# Patient Record
Sex: MALE | Race: BLACK OR AFRICAN AMERICAN | NOT HISPANIC OR LATINO | Employment: FULL TIME | URBAN - METROPOLITAN AREA
[De-identification: names, ages, dates, MRNs, and addresses within clinical notes are randomized per-mention and may not be internally consistent; named-entity substitution may affect disease eponyms.]

---

## 2017-01-23 ENCOUNTER — OFFICE VISIT (OUTPATIENT)
Dept: ORTHOPEDICS | Facility: CLINIC | Age: 32
End: 2017-01-23
Payer: OTHER MISCELLANEOUS

## 2017-01-23 VITALS — HEART RATE: 85 BPM | DIASTOLIC BLOOD PRESSURE: 82 MMHG | SYSTOLIC BLOOD PRESSURE: 134 MMHG | HEIGHT: 70 IN

## 2017-01-23 DIAGNOSIS — S86.012D ACHILLES TENDON TEAR, LEFT, SUBSEQUENT ENCOUNTER: Primary | ICD-10-CM

## 2017-01-23 PROCEDURE — 99999 PR PBB SHADOW E&M-EST. PATIENT-LVL II: CPT | Mod: PBBFAC,,, | Performed by: ORTHOPAEDIC SURGERY

## 2017-01-23 PROCEDURE — 99212 OFFICE O/P EST SF 10 MIN: CPT | Mod: S$GLB,,, | Performed by: ORTHOPAEDIC SURGERY

## 2017-01-24 NOTE — PROGRESS NOTES
Mr. Arias returns today.  It has been about two and a half months since his   left Achilles tendon tear.  He has been treated nonoperatively with casting and   since his last visit boot.  He returns today for followup.  He reports minimal   discomfort.  Examination reveals some continued swelling.  The calf compression   produces plantar flexion of the foot.  There is minimal tenderness over the   Achilles tendon.  At this point, I would like him to start some physical   therapy.  He can come out of the boot when he is at home.  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  I am going to have him   return to see me in six weeks for followup.      CHARLIE  dd: 01/23/2017 10:56:55 (CST)  td: 01/24/2017 00:13:40 (CST)  Doc ID   #[id number]  Job ID #[id number]    CC:

## 2017-02-03 ENCOUNTER — TELEPHONE (OUTPATIENT)
Dept: ORTHOPEDICS | Facility: CLINIC | Age: 32
End: 2017-02-03

## 2017-02-03 NOTE — TELEPHONE ENCOUNTER
----- Message from María Wick sent at 2/3/2017  3:41 PM CST -----  Contact: self@ HOME   Pt need to get a order for PT.

## 2017-02-06 ENCOUNTER — TELEPHONE (OUTPATIENT)
Dept: ORTHOPEDICS | Facility: CLINIC | Age: 32
End: 2017-02-06

## 2017-02-06 NOTE — TELEPHONE ENCOUNTER
Returned call and left message asking patient to call back with name of PT location and fax number if he has it and I will send the order to them.

## 2017-02-06 NOTE — TELEPHONE ENCOUNTER
----- Message from Alberto Card sent at 2/6/2017  9:55 AM CST -----  Contact: patient  Pt called to give the information for the physical therapy. [facility]. [phone]   (Fax)[phone]

## 2017-03-06 ENCOUNTER — OFFICE VISIT (OUTPATIENT)
Dept: ORTHOPEDICS | Facility: CLINIC | Age: 32
End: 2017-03-06
Payer: OTHER MISCELLANEOUS

## 2017-03-06 VITALS
WEIGHT: 298.5 LBS | SYSTOLIC BLOOD PRESSURE: 138 MMHG | DIASTOLIC BLOOD PRESSURE: 92 MMHG | BODY MASS INDEX: 42.73 KG/M2 | HEIGHT: 70 IN | HEART RATE: 106 BPM | RESPIRATION RATE: 18 BRPM

## 2017-03-06 DIAGNOSIS — S86.012D ACHILLES TENDON TEAR, LEFT, SUBSEQUENT ENCOUNTER: Primary | ICD-10-CM

## 2017-03-06 PROCEDURE — 99212 OFFICE O/P EST SF 10 MIN: CPT | Mod: S$GLB,,, | Performed by: ORTHOPAEDIC SURGERY

## 2017-03-06 PROCEDURE — 99999 PR PBB SHADOW E&M-EST. PATIENT-LVL III: CPT | Mod: PBBFAC,,, | Performed by: ORTHOPAEDIC SURGERY

## 2017-03-07 NOTE — PROGRESS NOTES
Mr. Arias returns today.  It has been four months since he sustained a left   Achilles tendon tear.  He has been treated nonoperatively.  He is making good   progress.  He reports no pain.  He has been fully weightbearing in the boot.  He   started physical therapy.  Examination today reveals that the Achilles tendon   is intact.  Calf compression produces good plantar flexion of the foot.  He has   no tenderness.  He still has some mild calf atrophy.  At this point, he can come   out of the boot.  He can progress his low-impact activity as tolerated.  He   should avoid any high-impact activity for the next two months.  I am going to   have him return to see me as needed.      CHARLIE  dd: 03/06/2017 09:51:42 (CST)  td: 03/07/2017 09:12:25 (CST)  Doc ID   #[id number]  Job ID #[id number]    CC:

## 2017-03-31 ENCOUNTER — TELEPHONE (OUTPATIENT)
Dept: ORTHOPEDICS | Facility: CLINIC | Age: 32
End: 2017-03-31

## 2017-03-31 NOTE — TELEPHONE ENCOUNTER
----- Message from Mendy Hall sent at 3/31/2017 11:56 AM CDT -----  Contact: Pt  Pt would like to speak with someone regarding his rehab    Pt contact number  [phone]  Thanks

## 2017-04-07 ENCOUNTER — TELEPHONE (OUTPATIENT)
Dept: ORTHOPEDICS | Facility: CLINIC | Age: 32
End: 2017-04-07

## 2017-04-07 NOTE — TELEPHONE ENCOUNTER
----- Message from María Wick sent at 4/7/2017  2:28 PM CDT -----  Contact: self@ home   Pt is calling to get a order for PT  And pt is having left ankle pain.

## 2017-04-07 NOTE — TELEPHONE ENCOUNTER
Patient called back. [facility]. 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 He says he is icing and elevating without much relief. I suggested ibuprofen but he doesn't like to take medicine. I will talk to  on Monday and call him back.

## 2020-04-27 DIAGNOSIS — M25.511 CHRONIC RIGHT SHOULDER PAIN: Primary | ICD-10-CM

## 2020-04-27 DIAGNOSIS — G89.29 CHRONIC RIGHT SHOULDER PAIN: Primary | ICD-10-CM

## 2020-04-29 ENCOUNTER — HOSPITAL ENCOUNTER (OUTPATIENT)
Dept: RADIOLOGY | Facility: HOSPITAL | Age: 35
Discharge: HOME | End: 2020-04-29
Attending: PHYSICIAN ASSISTANT
Payer: COMMERCIAL

## 2020-04-29 DIAGNOSIS — M25.511 CHRONIC RIGHT SHOULDER PAIN: ICD-10-CM

## 2020-04-29 DIAGNOSIS — G89.29 CHRONIC RIGHT SHOULDER PAIN: ICD-10-CM

## 2020-04-29 PROCEDURE — 73030 X-RAY EXAM OF SHOULDER: CPT | Mod: TC,FY,RT

## 2020-04-29 PROCEDURE — 73030 X-RAY EXAM OF SHOULDER: CPT | Mod: 26,RT,, | Performed by: RADIOLOGY

## 2020-04-29 PROCEDURE — 73030 XR SHOULDER COMPLETE 2 OR MORE VIEWS RIGHT: ICD-10-PCS | Mod: 26,RT,, | Performed by: RADIOLOGY

## 2021-07-01 ENCOUNTER — OCCUPATIONAL HEALTH (OUTPATIENT)
Dept: URGENT CARE | Facility: CLINIC | Age: 36
End: 2021-07-01

## 2021-07-01 DIAGNOSIS — Z02.83 ENCOUNTER FOR DRUG SCREENING: Primary | ICD-10-CM

## 2021-07-01 DIAGNOSIS — Z02.1 ENCOUNTER FOR PRE-EMPLOYMENT HEALTH SCREENING EXAMINATION: Primary | ICD-10-CM

## 2021-07-01 PROCEDURE — 99199 OCC MED MRO FEE: ICD-10-PCS | Mod: S$GLB,,, | Performed by: EMERGENCY MEDICINE

## 2021-07-01 PROCEDURE — 99199 UNLISTED SPECIAL SVC PX/RPRT: CPT | Mod: S$GLB,,, | Performed by: EMERGENCY MEDICINE

## 2021-07-01 PROCEDURE — 99499 UNLISTED E&M SERVICE: CPT | Mod: S$GLB,,, | Performed by: EMERGENCY MEDICINE

## 2021-07-01 PROCEDURE — 80305 DRUG TEST PRSMV DIR OPT OBS: CPT | Mod: S$GLB,,, | Performed by: EMERGENCY MEDICINE

## 2021-07-01 PROCEDURE — 99499 PHYSICAL, BASIC COMPLEXITY: ICD-10-PCS | Mod: S$GLB,,, | Performed by: EMERGENCY MEDICINE

## 2021-07-01 PROCEDURE — 80305 OOH COLLECTION ONLY DRUG SCREEN: ICD-10-PCS | Mod: S$GLB,,, | Performed by: EMERGENCY MEDICINE

## 2021-09-01 ENCOUNTER — HOSPITAL ENCOUNTER (EMERGENCY)
Facility: HOSPITAL | Age: 36
Discharge: HOME | End: 2021-09-01
Attending: EMERGENCY MEDICINE
Payer: COMMERCIAL

## 2021-09-01 VITALS
WEIGHT: 302 LBS | BODY MASS INDEX: 44.73 KG/M2 | OXYGEN SATURATION: 98 % | RESPIRATION RATE: 18 BRPM | HEIGHT: 69 IN | DIASTOLIC BLOOD PRESSURE: 98 MMHG | SYSTOLIC BLOOD PRESSURE: 146 MMHG | TEMPERATURE: 99 F | HEART RATE: 98 BPM

## 2021-09-01 DIAGNOSIS — M79.10 MUSCLE SORENESS: Primary | ICD-10-CM
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
## 2021-09-01 PROCEDURE — 99284 EMERGENCY DEPT VISIT MOD MDM: CPT

## 2021-09-01 RX ORDER — METHOCARBAMOL 500 MG/1
500 TABLET, FILM COATED ORAL 3 TIMES DAILY
Qty: 30 TABLET | Refills: 0 | Status: SHIPPED | OUTPATIENT
Start: 2021-09-01 | End: 2021-09-06

## 2021-09-01 RX ORDER — IBUPROFEN 800 MG/1
800 TABLET ORAL EVERY 6 HOURS PRN
Qty: 20 TABLET | Refills: 0 | Status: SHIPPED | OUTPATIENT
Start: 2021-09-01

## 2023-06-26 DIAGNOSIS — R52 PAIN: Primary | ICD-10-CM

## 2023-12-18 DIAGNOSIS — G89.29 CHRONIC PAIN OF LEFT ANKLE: Primary | ICD-10-CM

## 2023-12-18 DIAGNOSIS — M25.572 CHRONIC PAIN OF LEFT ANKLE: Primary | ICD-10-CM
